# Patient Record
Sex: MALE | URBAN - METROPOLITAN AREA
[De-identification: names, ages, dates, MRNs, and addresses within clinical notes are randomized per-mention and may not be internally consistent; named-entity substitution may affect disease eponyms.]

---

## 2018-10-10 ENCOUNTER — IMPORTED ENCOUNTER (OUTPATIENT)
Dept: URBAN - METROPOLITAN AREA CLINIC 31 | Facility: CLINIC | Age: 33
End: 2018-10-10

## 2018-10-10 PROCEDURE — 92310 CONTACT LENS FITTING OU: CPT

## 2018-10-10 PROCEDURE — V2520 CONTACT LENS HYDROPHILIC: HCPCS

## 2018-10-10 PROCEDURE — 92014 COMPRE OPH EXAM EST PT 1/>: CPT

## 2018-10-10 NOTE — PATIENT DISCUSSION
1.  Refractive error - Continue present contact lens modality. Stop/wear and call if any redness pain or decrease in vision occur. Glasses change optional. 2.  Return for an appointment in 1 year for comprehensive exam. with Dr. Brandon Lennon.

## 2018-10-26 ENCOUNTER — IMPORTED ENCOUNTER (OUTPATIENT)
Dept: URBAN - METROPOLITAN AREA CLINIC 31 | Facility: CLINIC | Age: 33
End: 2018-10-26

## 2018-10-26 PROBLEM — H52.10: Noted: 2018-10-26

## 2018-10-26 NOTE — PATIENT DISCUSSION
We discussed risks vs. benefits of surgery. The patient has watched and understands the informed consent video. I have discussed the risks of laser refractive surgery with the patient. I informed the patient of the risk of infection (1:1000 for PRK and 1:3000 to 1:5000 for LASIK.)  I informed the patient of the possibility of complications related to creating the flap during LASIK surgery and that such complications may require that completion of their procedure be delayed for months. I have discussed the possibility of postoperative halos starbursts and dryness. I reviewed the risk of corneal ectasia. We discussed the possibility of need for enhancement surgery. I have discussed the need for postoperative reading glasses. I have discussed the possibility for the patient to require glasses and/or contact lenses postoperatively for the clearest vision possible. I have answered all of the patients questions. The patient watched instructional LASIK video.

## 2018-10-26 NOTE — PATIENT DISCUSSION
Surgery performed without difficulty. PO drops and instructions reviewed. Pt tolerated well and released to home in good condition.

## 2019-10-17 ENCOUNTER — IMPORTED ENCOUNTER (OUTPATIENT)
Dept: URBAN - METROPOLITAN AREA CLINIC 31 | Facility: CLINIC | Age: 34
End: 2019-10-17

## 2019-10-17 PROCEDURE — 92310 CONTACT LENS FITTING OU: CPT

## 2019-10-17 PROCEDURE — V2520 CONTACT LENS HYDROPHILIC: HCPCS

## 2019-10-17 PROCEDURE — 92014 COMPRE OPH EXAM EST PT 1/>: CPT

## 2019-10-17 NOTE — PATIENT DISCUSSION
1.  Refractive error - Continue present contact lens modality. Stop/wear and call if any redness pain or decrease in vision occur. Glasses change optional. 2.  Return for an appointment in 1 year for comprehensive exam. with Dr. Mone Gleason

## 2020-10-22 ENCOUNTER — IMPORTED ENCOUNTER (OUTPATIENT)
Dept: URBAN - METROPOLITAN AREA CLINIC 31 | Facility: CLINIC | Age: 35
End: 2020-10-22

## 2020-10-22 PROCEDURE — 92014 COMPRE OPH EXAM EST PT 1/>: CPT

## 2020-10-22 PROCEDURE — 92310 CONTACT LENS FITTING OU: CPT

## 2020-10-22 NOTE — PATIENT DISCUSSION
1. Refractive error - No glasses change. To try Fritz Bilis and see if prefers to Bed[place]. Can order preference. 2. Continue present contact lens modality. Stop/wear and call if any redness pain or decrease in vision occur. 3. Return for an appointment in 1 year for comprehensive exam. with Dr. Jyoti Harrington.

## 2021-10-22 ENCOUNTER — IMPORTED ENCOUNTER (OUTPATIENT)
Dept: URBAN - METROPOLITAN AREA CLINIC 31 | Facility: CLINIC | Age: 36
End: 2021-10-22

## 2021-10-22 PROCEDURE — 92014 COMPRE OPH EXAM EST PT 1/>: CPT

## 2021-10-22 PROCEDURE — 92310 CONTACT LENS FITTING OU: CPT

## 2021-10-22 NOTE — PATIENT DISCUSSION
1.  Refractive error - Continue present contact lens modality. Stop/wear and call if any redness pain or decrease in vision occur. Glasses change optional. 2.  Return for an appointment in 1 year for comprehensive exam. with Dr. Flower Lechuga.

## 2022-04-02 ASSESSMENT — VISUAL ACUITY
OS_SC: 20/20-1
OS_SC: 20/20
OS_SC: 20/20
OU_CC: J1+
OD_SC: 20/20-3
OD_SC: 20/20
OD_SC: 20/20
OU_SC: 20/20

## 2022-04-02 ASSESSMENT — TONOMETRY
OD_IOP_MMHG: 14
OS_IOP_MMHG: 14
OS_IOP_MMHG: 15
OS_IOP_MMHG: 13
OS_IOP_MMHG: 14
OD_IOP_MMHG: 15
OS_IOP_MMHG: 15
OD_IOP_MMHG: 13
OD_IOP_MMHG: 16
OD_IOP_MMHG: 15

## 2022-12-16 ENCOUNTER — ESTABLISHED PATIENT (OUTPATIENT)
Dept: URBAN - METROPOLITAN AREA CLINIC 29 | Facility: CLINIC | Age: 37
End: 2022-12-16

## 2022-12-16 PROCEDURE — 92310-2 LEVEL 2 CONTACT LENS MANAGEMENT

## 2022-12-16 PROCEDURE — 92014 COMPRE OPH EXAM EST PT 1/>: CPT

## 2022-12-16 PROCEDURE — 92015 DETERMINE REFRACTIVE STATE: CPT

## 2022-12-16 ASSESSMENT — VISUAL ACUITY
OS_SC: 20/400
OD_CC: 20/20
OD_CC: 20/20
OD_SC: 20/400
OS_CC: 20/20
OS_CC: 20/20

## 2022-12-16 ASSESSMENT — TONOMETRY
OS_IOP_MMHG: 17
OD_IOP_MMHG: 15

## 2022-12-16 NOTE — PATIENT DISCUSSION
Continue present contact lens modality. Stop/wear and call if any redness pain or decrease in vision occur.  Glasses change optional.

## 2023-12-19 ENCOUNTER — ESTABLISHED PATIENT (OUTPATIENT)
Dept: URBAN - METROPOLITAN AREA CLINIC 29 | Facility: CLINIC | Age: 38
End: 2023-12-19

## 2023-12-19 DIAGNOSIS — H52.223: ICD-10-CM

## 2023-12-19 DIAGNOSIS — H52.13: ICD-10-CM

## 2023-12-19 PROCEDURE — 92310-2 LEVEL 2 CONTACT LENS MANAGEMENT

## 2023-12-19 PROCEDURE — 92014 COMPRE OPH EXAM EST PT 1/>: CPT

## 2023-12-19 PROCEDURE — 92015 DETERMINE REFRACTIVE STATE: CPT

## 2023-12-19 ASSESSMENT — TONOMETRY
OS_IOP_MMHG: 16
OD_IOP_MMHG: 16

## 2023-12-19 ASSESSMENT — VISUAL ACUITY
OD_CC: 20/20
OS_CC: 20/20-2

## 2024-12-30 ENCOUNTER — COMPREHENSIVE EXAM (OUTPATIENT)
Age: 39
End: 2024-12-30

## 2024-12-30 DIAGNOSIS — H52.13: ICD-10-CM

## 2024-12-30 DIAGNOSIS — H52.223: ICD-10-CM

## 2024-12-30 PROCEDURE — 92014 COMPRE OPH EXAM EST PT 1/>: CPT

## 2024-12-30 PROCEDURE — 92310-1 LEVEL 1 SOFT LENS UPDATE

## 2024-12-30 PROCEDURE — 92015 DETERMINE REFRACTIVE STATE: CPT
